# Patient Record
Sex: MALE | Race: WHITE | Employment: UNEMPLOYED | URBAN - METROPOLITAN AREA
[De-identification: names, ages, dates, MRNs, and addresses within clinical notes are randomized per-mention and may not be internally consistent; named-entity substitution may affect disease eponyms.]

---

## 2019-02-22 ENCOUNTER — OFFICE VISIT (OUTPATIENT)
Dept: FAMILY MEDICINE CLINIC | Age: 64
End: 2019-02-22
Payer: MEDICARE

## 2019-02-22 VITALS
WEIGHT: 214 LBS | HEART RATE: 60 BPM | RESPIRATION RATE: 16 BRPM | TEMPERATURE: 98.3 F | HEIGHT: 72 IN | OXYGEN SATURATION: 99 % | BODY MASS INDEX: 28.99 KG/M2 | SYSTOLIC BLOOD PRESSURE: 135 MMHG | DIASTOLIC BLOOD PRESSURE: 87 MMHG

## 2019-02-22 DIAGNOSIS — J01.00 ACUTE NON-RECURRENT MAXILLARY SINUSITIS: Primary | ICD-10-CM

## 2019-02-22 PROCEDURE — 99213 OFFICE O/P EST LOW 20 MIN: CPT | Performed by: FAMILY MEDICINE

## 2019-02-22 PROCEDURE — G8427 DOCREV CUR MEDS BY ELIG CLIN: HCPCS | Performed by: FAMILY MEDICINE

## 2019-02-22 PROCEDURE — G8484 FLU IMMUNIZE NO ADMIN: HCPCS | Performed by: FAMILY MEDICINE

## 2019-02-22 PROCEDURE — 3017F COLORECTAL CA SCREEN DOC REV: CPT | Performed by: FAMILY MEDICINE

## 2019-02-22 PROCEDURE — 1036F TOBACCO NON-USER: CPT | Performed by: FAMILY MEDICINE

## 2019-02-22 PROCEDURE — G8419 CALC BMI OUT NRM PARAM NOF/U: HCPCS | Performed by: FAMILY MEDICINE

## 2019-02-22 RX ORDER — FLUTICASONE PROPIONATE 50 MCG
1 SPRAY, SUSPENSION (ML) NASAL 2 TIMES DAILY
Qty: 1 BOTTLE | Refills: 2 | Status: SHIPPED | OUTPATIENT
Start: 2019-02-22 | End: 2021-12-01 | Stop reason: ALTCHOICE

## 2019-02-22 RX ORDER — BROMPHENIRAMINE MALEATE, PSEUDOEPHEDRINE HYDROCHLORIDE, AND DEXTROMETHORPHAN HYDROBROMIDE 2; 30; 10 MG/5ML; MG/5ML; MG/5ML
5 SYRUP ORAL 4 TIMES DAILY PRN
Qty: 180 ML | Refills: 1 | Status: SHIPPED | OUTPATIENT
Start: 2019-02-22 | End: 2021-12-01 | Stop reason: ALTCHOICE

## 2019-02-22 RX ORDER — AZITHROMYCIN 250 MG/1
TABLET, FILM COATED ORAL
Qty: 1 PACKET | Refills: 0 | Status: SHIPPED | OUTPATIENT
Start: 2019-02-22 | End: 2021-12-01 | Stop reason: ALTCHOICE

## 2019-02-22 ASSESSMENT — ENCOUNTER SYMPTOMS
GASTROINTESTINAL NEGATIVE: 1
EYES NEGATIVE: 1
SINUS PAIN: 1
SHORTNESS OF BREATH: 0
WHEEZING: 1
COUGH: 1
ALLERGIC/IMMUNOLOGIC NEGATIVE: 1
SORE THROAT: 1
SINUS PRESSURE: 1
RHINORRHEA: 1

## 2019-02-22 ASSESSMENT — PATIENT HEALTH QUESTIONNAIRE - PHQ9
SUM OF ALL RESPONSES TO PHQ QUESTIONS 1-9: 0
2. FEELING DOWN, DEPRESSED OR HOPELESS: 0
1. LITTLE INTEREST OR PLEASURE IN DOING THINGS: 0
SUM OF ALL RESPONSES TO PHQ QUESTIONS 1-9: 0
DEPRESSION UNABLE TO ASSESS: PT REFUSES
SUM OF ALL RESPONSES TO PHQ9 QUESTIONS 1 & 2: 0

## 2019-03-21 ENCOUNTER — HOSPITAL ENCOUNTER (EMERGENCY)
Age: 64
Discharge: HOME | End: 2019-03-21
Attending: EMERGENCY MEDICINE
Payer: MEDICARE

## 2019-03-21 VITALS
HEIGHT: 66 IN | RESPIRATION RATE: 18 BRPM | OXYGEN SATURATION: 100 % | BODY MASS INDEX: 30.05 KG/M2 | SYSTOLIC BLOOD PRESSURE: 136 MMHG | HEART RATE: 60 BPM | WEIGHT: 187 LBS | DIASTOLIC BLOOD PRESSURE: 66 MMHG | TEMPERATURE: 98.2 F

## 2019-03-21 DIAGNOSIS — R11.2 NON-INTRACTABLE VOMITING WITH NAUSEA, UNSPECIFIED VOMITING TYPE: Primary | ICD-10-CM

## 2019-03-21 LAB
ABSOLUTE EOS #: 0 K/UL (ref 0–0.4)
ABSOLUTE IMMATURE GRANULOCYTE: ABNORMAL K/UL (ref 0–0.3)
ABSOLUTE LYMPH #: 0.9 K/UL (ref 1–4.8)
ABSOLUTE MONO #: 0.4 K/UL (ref 0.1–1.3)
ANION GAP SERPL CALCULATED.3IONS-SCNC: 10 MMOL/L (ref 9–17)
BASOPHILS # BLD: 1 % (ref 0–2)
BASOPHILS ABSOLUTE: 0.1 K/UL (ref 0–0.2)
BUN BLDV-MCNC: 13 MG/DL (ref 8–23)
BUN/CREAT BLD: ABNORMAL (ref 9–20)
CALCIUM SERPL-MCNC: 9 MG/DL (ref 8.6–10.4)
CHLORIDE BLD-SCNC: 100 MMOL/L (ref 98–107)
CO2: 28 MMOL/L (ref 20–31)
CREAT SERPL-MCNC: 0.8 MG/DL (ref 0.7–1.2)
DIFFERENTIAL TYPE: ABNORMAL
EOSINOPHILS RELATIVE PERCENT: 1 % (ref 0–4)
GFR AFRICAN AMERICAN: >60 ML/MIN
GFR NON-AFRICAN AMERICAN: >60 ML/MIN
GFR SERPL CREATININE-BSD FRML MDRD: ABNORMAL ML/MIN/{1.73_M2}
GFR SERPL CREATININE-BSD FRML MDRD: ABNORMAL ML/MIN/{1.73_M2}
GLUCOSE BLD-MCNC: 129 MG/DL (ref 70–99)
HCT VFR BLD CALC: 43.5 % (ref 41–53)
HEMOGLOBIN: 14.7 G/DL (ref 13.5–17.5)
IMMATURE GRANULOCYTES: ABNORMAL %
LYMPHOCYTES # BLD: 15 % (ref 24–44)
MCH RBC QN AUTO: 31.1 PG (ref 26–34)
MCHC RBC AUTO-ENTMCNC: 33.9 G/DL (ref 31–37)
MCV RBC AUTO: 91.6 FL (ref 80–100)
MONOCYTES # BLD: 7 % (ref 1–7)
NRBC AUTOMATED: ABNORMAL PER 100 WBC
PDW BLD-RTO: 13.4 % (ref 11.5–14.9)
PLATELET # BLD: 184 K/UL (ref 150–450)
PLATELET ESTIMATE: ABNORMAL
PMV BLD AUTO: 7.8 FL (ref 6–12)
POTASSIUM SERPL-SCNC: 3.8 MMOL/L (ref 3.7–5.3)
RBC # BLD: 4.75 M/UL (ref 4.5–5.9)
RBC # BLD: ABNORMAL 10*6/UL
SEG NEUTROPHILS: 76 % (ref 36–66)
SEGMENTED NEUTROPHILS ABSOLUTE COUNT: 4.7 K/UL (ref 1.3–9.1)
SODIUM BLD-SCNC: 138 MMOL/L (ref 135–144)
WBC # BLD: 6.2 K/UL (ref 3.5–11)
WBC # BLD: ABNORMAL 10*3/UL

## 2019-03-21 PROCEDURE — 80048 BASIC METABOLIC PNL TOTAL CA: CPT

## 2019-03-21 PROCEDURE — 36415 COLL VENOUS BLD VENIPUNCTURE: CPT

## 2019-03-21 PROCEDURE — 2580000003 HC RX 258: Performed by: EMERGENCY MEDICINE

## 2019-03-21 PROCEDURE — 99284 EMERGENCY DEPT VISIT MOD MDM: CPT

## 2019-03-21 PROCEDURE — 85025 COMPLETE CBC W/AUTO DIFF WBC: CPT

## 2019-03-21 PROCEDURE — 6360000002 HC RX W HCPCS: Performed by: EMERGENCY MEDICINE

## 2019-03-21 PROCEDURE — 96365 THER/PROPH/DIAG IV INF INIT: CPT

## 2019-03-21 RX ORDER — 0.9 % SODIUM CHLORIDE 0.9 %
1000 INTRAVENOUS SOLUTION INTRAVENOUS ONCE
Status: COMPLETED | OUTPATIENT
Start: 2019-03-21 | End: 2019-03-21

## 2019-03-21 RX ORDER — PROMETHAZINE HYDROCHLORIDE 25 MG/ML
25 INJECTION, SOLUTION INTRAMUSCULAR; INTRAVENOUS ONCE
Status: DISCONTINUED | OUTPATIENT
Start: 2019-03-21 | End: 2019-03-21

## 2019-03-21 RX ORDER — PROMETHAZINE HYDROCHLORIDE 25 MG/1
25 TABLET ORAL EVERY 6 HOURS PRN
Qty: 15 TABLET | Refills: 0 | Status: SHIPPED | OUTPATIENT
Start: 2019-03-21 | End: 2021-12-01

## 2019-03-21 RX ADMIN — SODIUM CHLORIDE 1000 ML: 9 INJECTION, SOLUTION INTRAVENOUS at 11:16

## 2019-03-21 RX ADMIN — PROMETHAZINE HYDROCHLORIDE 25 MG: 25 INJECTION INTRAMUSCULAR; INTRAVENOUS at 11:14

## 2019-03-21 ASSESSMENT — ENCOUNTER SYMPTOMS
ORTHOPNEA: 0
NAUSEA: 1
VOMITING: 1
COUGH: 0
WHEEZING: 0
SHORTNESS OF BREATH: 0
ABDOMINAL PAIN: 0
DIARRHEA: 0
BACK PAIN: 0

## 2019-03-21 ASSESSMENT — PAIN SCALES - GENERAL: PAINLEVEL_OUTOF10: 8

## 2021-11-10 ENCOUNTER — HOSPITAL ENCOUNTER (OUTPATIENT)
Age: 66
Discharge: HOME | End: 2021-11-10
Payer: MEDICARE

## 2021-11-10 ENCOUNTER — HOSPITAL ENCOUNTER (OUTPATIENT)
Dept: GENERAL RADIOLOGY | Age: 66
Discharge: HOME | End: 2021-11-12
Payer: MEDICARE

## 2021-11-10 ENCOUNTER — HOSPITAL ENCOUNTER (OUTPATIENT)
Age: 66
Discharge: HOME | End: 2021-11-12
Payer: MEDICARE

## 2021-11-10 DIAGNOSIS — M46.07 SPINAL ENTHESOPATHY OF LUMBOSACRAL REGION (HCC): ICD-10-CM

## 2021-11-10 LAB
-: ABNORMAL
ABSOLUTE EOS #: 0.1 K/UL (ref 0–0.4)
ABSOLUTE IMMATURE GRANULOCYTE: ABNORMAL K/UL (ref 0–0.3)
ABSOLUTE LYMPH #: 1.3 K/UL (ref 1–4.8)
ABSOLUTE MONO #: 0.5 K/UL (ref 0.1–1.3)
ALBUMIN SERPL-MCNC: 4.3 G/DL (ref 3.5–5.2)
ALBUMIN/GLOBULIN RATIO: ABNORMAL (ref 1–2.5)
ALP BLD-CCNC: 97 U/L (ref 40–129)
ALT SERPL-CCNC: 26 U/L (ref 5–41)
AMORPHOUS: ABNORMAL
ANION GAP SERPL CALCULATED.3IONS-SCNC: 8 MMOL/L (ref 9–17)
AST SERPL-CCNC: 19 U/L
BACTERIA: ABNORMAL
BASOPHILS # BLD: 2 % (ref 0–2)
BASOPHILS ABSOLUTE: 0.1 K/UL (ref 0–0.2)
BILIRUB SERPL-MCNC: 1.53 MG/DL (ref 0.3–1.2)
BILIRUBIN URINE: NEGATIVE
BUN BLDV-MCNC: 12 MG/DL (ref 8–23)
BUN/CREAT BLD: ABNORMAL (ref 9–20)
CALCIUM SERPL-MCNC: 9.1 MG/DL (ref 8.6–10.4)
CASTS UA: ABNORMAL /LPF
CHLORIDE BLD-SCNC: 101 MMOL/L (ref 98–107)
CHOLESTEROL/HDL RATIO: 4.5
CHOLESTEROL: 254 MG/DL
CO2: 28 MMOL/L (ref 20–31)
COLOR: ABNORMAL
COMMENT UA: ABNORMAL
CREAT SERPL-MCNC: 0.82 MG/DL (ref 0.7–1.2)
CRYSTALS, UA: ABNORMAL /HPF
DIFFERENTIAL TYPE: ABNORMAL
EKG ATRIAL RATE: 69 BPM
EKG P AXIS: 65 DEGREES
EKG P-R INTERVAL: 214 MS
EKG Q-T INTERVAL: 408 MS
EKG QRS DURATION: 82 MS
EKG QTC CALCULATION (BAZETT): 437 MS
EKG R AXIS: 10 DEGREES
EKG T AXIS: 68 DEGREES
EKG VENTRICULAR RATE: 69 BPM
EOSINOPHILS RELATIVE PERCENT: 2 % (ref 0–4)
EPITHELIAL CELLS UA: ABNORMAL /HPF
GFR AFRICAN AMERICAN: >60 ML/MIN
GFR NON-AFRICAN AMERICAN: >60 ML/MIN
GFR SERPL CREATININE-BSD FRML MDRD: ABNORMAL ML/MIN/{1.73_M2}
GFR SERPL CREATININE-BSD FRML MDRD: ABNORMAL ML/MIN/{1.73_M2}
GLUCOSE BLD-MCNC: 117 MG/DL (ref 70–99)
GLUCOSE URINE: NEGATIVE
HCT VFR BLD CALC: 46 % (ref 41–53)
HDLC SERPL-MCNC: 56 MG/DL
HEMOGLOBIN: 15.8 G/DL (ref 13.5–17.5)
IMMATURE GRANULOCYTES: ABNORMAL %
KETONES, URINE: NEGATIVE
LDL CHOLESTEROL: 167 MG/DL (ref 0–130)
LEUKOCYTE ESTERASE, URINE: NEGATIVE
LYMPHOCYTES # BLD: 18 % (ref 24–44)
MCH RBC QN AUTO: 31.8 PG (ref 26–34)
MCHC RBC AUTO-ENTMCNC: 34.4 G/DL (ref 31–37)
MCV RBC AUTO: 92.3 FL (ref 80–100)
MONOCYTES # BLD: 7 % (ref 1–7)
MUCUS: ABNORMAL
NITRITE, URINE: NEGATIVE
NRBC AUTOMATED: ABNORMAL PER 100 WBC
OTHER OBSERVATIONS UA: ABNORMAL
PDW BLD-RTO: 14.1 % (ref 11.5–14.9)
PH UA: 7 (ref 5–8)
PLATELET # BLD: 220 K/UL (ref 150–450)
PLATELET ESTIMATE: ABNORMAL
PMV BLD AUTO: 6.2 FL (ref 6–12)
POTASSIUM SERPL-SCNC: 4.8 MMOL/L (ref 3.7–5.3)
PROTEIN UA: ABNORMAL
RBC # BLD: 4.98 M/UL (ref 4.5–5.9)
RBC # BLD: ABNORMAL 10*6/UL
RBC UA: ABNORMAL /HPF
RENAL EPITHELIAL, UA: ABNORMAL /HPF
SEG NEUTROPHILS: 71 % (ref 36–66)
SEGMENTED NEUTROPHILS ABSOLUTE COUNT: 5.2 K/UL (ref 1.3–9.1)
SODIUM BLD-SCNC: 137 MMOL/L (ref 135–144)
SPECIFIC GRAVITY UA: 1.03 (ref 1–1.03)
TOTAL PROTEIN: 7.3 G/DL (ref 6.4–8.3)
TRICHOMONAS: ABNORMAL
TRIGL SERPL-MCNC: 153 MG/DL
TSH SERPL DL<=0.05 MIU/L-ACNC: 0.81 MIU/L (ref 0.3–5)
TURBIDITY: CLEAR
URINE HGB: NEGATIVE
UROBILINOGEN, URINE: NORMAL
VITAMIN D 25-HYDROXY: 29.7 NG/ML (ref 30–100)
VLDLC SERPL CALC-MCNC: ABNORMAL MG/DL (ref 1–30)
WBC # BLD: 7.3 K/UL (ref 3.5–11)
WBC # BLD: ABNORMAL 10*3/UL
WBC UA: ABNORMAL /HPF
YEAST: ABNORMAL

## 2021-11-10 PROCEDURE — 72100 X-RAY EXAM L-S SPINE 2/3 VWS: CPT

## 2021-11-10 PROCEDURE — 80061 LIPID PANEL: CPT

## 2021-11-10 PROCEDURE — 81001 URINALYSIS AUTO W/SCOPE: CPT

## 2021-11-10 PROCEDURE — 85025 COMPLETE CBC W/AUTO DIFF WBC: CPT

## 2021-11-10 PROCEDURE — 93010 ELECTROCARDIOGRAM REPORT: CPT | Performed by: INTERNAL MEDICINE

## 2021-11-10 PROCEDURE — 84443 ASSAY THYROID STIM HORMONE: CPT

## 2021-11-10 PROCEDURE — 82306 VITAMIN D 25 HYDROXY: CPT

## 2021-11-10 PROCEDURE — 80053 COMPREHEN METABOLIC PANEL: CPT

## 2021-11-10 PROCEDURE — 93005 ELECTROCARDIOGRAM TRACING: CPT | Performed by: FAMILY MEDICINE

## 2021-11-10 PROCEDURE — 36415 COLL VENOUS BLD VENIPUNCTURE: CPT

## 2021-11-11 ENCOUNTER — HOSPITAL ENCOUNTER (OUTPATIENT)
Dept: ULTRASOUND IMAGING | Age: 66
Discharge: HOME | End: 2021-11-13
Payer: MEDICARE

## 2021-11-11 DIAGNOSIS — E88.2 LIPOMATOSIS DOLOROSA: ICD-10-CM

## 2021-11-11 PROCEDURE — 76536 US EXAM OF HEAD AND NECK: CPT

## 2021-12-10 ENCOUNTER — HOSPITAL ENCOUNTER (OUTPATIENT)
Dept: NUCLEAR MEDICINE | Age: 66
Discharge: HOME | End: 2021-12-12
Payer: MEDICARE

## 2021-12-10 ENCOUNTER — HOSPITAL ENCOUNTER (OUTPATIENT)
Dept: ULTRASOUND IMAGING | Age: 66
Discharge: HOME | End: 2021-12-12
Payer: MEDICARE

## 2021-12-10 ENCOUNTER — HOSPITAL ENCOUNTER (OUTPATIENT)
Dept: NON INVASIVE DIAGNOSTICS | Age: 66
Discharge: HOME | End: 2021-12-10
Payer: MEDICARE

## 2021-12-10 VITALS — HEIGHT: 69 IN | WEIGHT: 200 LBS | BODY MASS INDEX: 29.62 KG/M2

## 2021-12-10 DIAGNOSIS — R17 ELEVATED BILIRUBIN: ICD-10-CM

## 2021-12-10 DIAGNOSIS — R94.31 ABNORMAL EKG: ICD-10-CM

## 2021-12-10 LAB
LV EF: 70 %
LVEF MODALITY: NORMAL

## 2021-12-10 PROCEDURE — 2580000003 HC RX 258: Performed by: INTERNAL MEDICINE

## 2021-12-10 PROCEDURE — 78452 HT MUSCLE IMAGE SPECT MULT: CPT

## 2021-12-10 PROCEDURE — 3430000000 HC RX DIAGNOSTIC RADIOPHARMACEUTICAL: Performed by: INTERNAL MEDICINE

## 2021-12-10 PROCEDURE — 93017 CV STRESS TEST TRACING ONLY: CPT

## 2021-12-10 PROCEDURE — A9500 TC99M SESTAMIBI: HCPCS | Performed by: INTERNAL MEDICINE

## 2021-12-10 PROCEDURE — 76705 ECHO EXAM OF ABDOMEN: CPT

## 2021-12-10 PROCEDURE — 6360000002 HC RX W HCPCS: Performed by: INTERNAL MEDICINE

## 2021-12-10 RX ORDER — SODIUM CHLORIDE 0.9 % (FLUSH) 0.9 %
5-40 SYRINGE (ML) INJECTION PRN
Status: ACTIVE | OUTPATIENT
Start: 2021-12-10 | End: 2021-12-10

## 2021-12-10 RX ORDER — NITROGLYCERIN 0.4 MG/1
0.4 TABLET SUBLINGUAL EVERY 5 MIN PRN
Status: ACTIVE | OUTPATIENT
Start: 2021-12-10 | End: 2021-12-10

## 2021-12-10 RX ORDER — ALBUTEROL SULFATE 90 UG/1
2 AEROSOL, METERED RESPIRATORY (INHALATION) PRN
Status: ACTIVE | OUTPATIENT
Start: 2021-12-10 | End: 2021-12-10

## 2021-12-10 RX ORDER — ATROPINE SULFATE 0.1 MG/ML
0.5 INJECTION INTRAVENOUS EVERY 5 MIN PRN
Status: ACTIVE | OUTPATIENT
Start: 2021-12-10 | End: 2021-12-10

## 2021-12-10 RX ORDER — SODIUM CHLORIDE 0.9 % (FLUSH) 0.9 %
10 SYRINGE (ML) INJECTION PRN
Status: DISCONTINUED | OUTPATIENT
Start: 2021-12-10 | End: 2021-12-13 | Stop reason: HOSPADM

## 2021-12-10 RX ORDER — SODIUM CHLORIDE 9 MG/ML
500 INJECTION, SOLUTION INTRAVENOUS CONTINUOUS PRN
Status: ACTIVE | OUTPATIENT
Start: 2021-12-10 | End: 2021-12-10

## 2021-12-10 RX ORDER — METOPROLOL TARTRATE 5 MG/5ML
5 INJECTION INTRAVENOUS EVERY 5 MIN PRN
Status: ACTIVE | OUTPATIENT
Start: 2021-12-10 | End: 2021-12-10

## 2021-12-10 RX ORDER — AMINOPHYLLINE DIHYDRATE 25 MG/ML
50 INJECTION, SOLUTION INTRAVENOUS PRN
Status: ACTIVE | OUTPATIENT
Start: 2021-12-10 | End: 2021-12-10

## 2021-12-10 RX ADMIN — REGADENOSON 0.4 MG: 0.08 INJECTION, SOLUTION INTRAVENOUS at 10:11

## 2021-12-10 RX ADMIN — SODIUM CHLORIDE, PRESERVATIVE FREE 10 ML: 5 INJECTION INTRAVENOUS at 09:06

## 2021-12-10 RX ADMIN — SODIUM CHLORIDE, PRESERVATIVE FREE 10 ML: 5 INJECTION INTRAVENOUS at 07:57

## 2021-12-10 RX ADMIN — TETRAKIS(2-METHOXYISOBUTYLISOCYANIDE)COPPER(I) TETRAFLUOROBORATE 34 MILLICURIE: 1 INJECTION, POWDER, LYOPHILIZED, FOR SOLUTION INTRAVENOUS at 10:13

## 2021-12-10 RX ADMIN — TETRAKIS(2-METHOXYISOBUTYLISOCYANIDE)COPPER(I) TETRAFLUOROBORATE 10.9 MILLICURIE: 1 INJECTION, POWDER, LYOPHILIZED, FOR SOLUTION INTRAVENOUS at 07:56

## 2021-12-10 NOTE — PROGRESS NOTES
Lexiscan Stress Tech performs patient preparation of physical comfort, review test procedures, pre-stress EKG. Lung Sounds clear t/o. Consent verified by pt. .  Educated patient on test procedure and possible side effects of Lexiscan as well as s/s to report. Cardiologist reviewed pre-test EKG and is present for test.  Patient tolerated test well with minor retching/dry heaves which resolved to baseline after test with caffeine. Start 144/93, 57  Stop 155/90, 81  EKG portion of testing is completed and negative nuc. med. portion is still pending.

## 2021-12-15 NOTE — PROCEDURES
[address] [place], [place] [address]                              CARDIAC STRESS TEST    PATIENT NAME: Nam Gutierrez                        :        1955  MED REC NO:   [id number]                              ROOM:  ACCOUNT NO:   [de-identified]                           ADMIT DATE: 12/10/2021  PROVIDER:     Evelyn Muñoz    DATE OF STUDY:  12/10/2021    TEST TYPE: LEXISCAN CARDIOLITE STRESS TEST  INDICATION: ABNORMAL EKG  REFERRING PHYSICIAN: GABY WOODARD MD    RESTING HEART RATE: 57 BEATS PER MINUTE  RESTING BLOOD PRESSURE: 144/93 MMHG    MEDICATION(S) GIVEN: 0.4 MG IV LEXISCAN  REASON FOR TERMINATION: MEDICATION INFUSION COMPLETE    RESTING EKG: NORMAL SINUS BRADYCARDIA HEART RATE 57 BEATS PER MINUTE  STRESS HEART RESPONSE: NORMAL RESPONSE  BLOOD PRESSURE RESPONSE: APPROPRIATE  STRESS EKG: NO CHANGES SEEN HEART RATE INCREASE  BEATS PER MINUTE  CHEST DISCOMFORT: NO PAIN DURING STRESS  ISCHEMIC EKG CHANGES: NONE    EKG IMPRESSION: ELECTROCARDIOGRAPHICALLY NEGATIVE LEXISCAN STRESS TEST. RADIOISOTOPE RESULTS TO FOLLOW FROM THE DEPARTMENT OF NUCLEAR MEDICINE.     Juliocesar Hernandez    D: 12/15/2021 15:41:36       T: 12/15/2021 15:42:30     AS/MAYO  Job#: [id number]     Doc#: Unknown    CC:    (Retain this field even if not dictated or not decipherable)

## 2021-12-16 ENCOUNTER — HOSPITAL ENCOUNTER (OUTPATIENT)
Dept: PHYSICAL THERAPY | Age: 66
Setting detail: THERAPIES SERIES
Discharge: HOME | End: 2021-12-16
Payer: MEDICARE

## 2021-12-16 PROCEDURE — 97161 PT EVAL LOW COMPLEX 20 MIN: CPT

## 2021-12-16 PROCEDURE — 97110 THERAPEUTIC EXERCISES: CPT

## 2021-12-16 NOTE — CONSULTS
509 Count includes the [facility]   Outpatient Physical Therapy  Physical Therapy Lumbar Evaluation    Date:  2021  Patient: Ingrid Coughlin  : [birth year]  MRN: [id number]  Physician: Ladi Hughes MD    Insurance: [employer]. Auth required after 30 visits. Medical Diagnosis: M62.830 (ICD-10-CM) - Muscle spasm of back   Rehab Codes: M54.6, R53.1, M25.60  Onset Date: Ongoing. At least since 2018   Next 's appt: As needed    Subjective:   CC: Chronic upper back and scapular pain/tightness  HPI: Pt reports longstanding hx of chronic mid/upper back pain but has progressively worsened over the years. 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 Pt states that his pain and mobility has significantly worsened in the last 2-3 years and admits that has been increasingly sedentary since he retired. Pt's sister was present for today's evaluation and states that since he retired, he stopped moving all together and now sits in front of the television all day long. Pt reports stiffness along B scapular and thoracic regions, instability on feet, weakness in his muscles. Recently established care with a PCP and was referred to PT for conservative mgmt. Of note, pt also has a lipoma located at the posterior aspect of his neck and will be undergoing an excision procedure in the near future.      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 Medications: [] Refer to full medical record [] None [x] Other: Tylenol and Celebrex  Allergies:      [x] Refer to full medical record  [] None [] Other:    Work Status: Retired     Prior Level of Function: Previously active and independent when he was working full-time. States that over the last several years he has stopped any kind of physical activity. Pain:  [x] Yes  [] No Location: Mid-back and shoulder blades Pain Rating: (0-10 scale) 8/10  Pain altered Tx:  [x] Yes  [] No  Action:    Symptoms:  [] Improving [] Worsening [] Same  Aggravating factors: Movement of neck and L UE  Alleviating factors: Forward bending      Functional Status Previous level of function Current level of function Comments   Sitting [x] Independent  [] Deficit [x] Independent  [] Deficit    Standing/walking [x] Independent  [] Deficit [] Independent  [x] Deficit Unsteadiness on feet  Pain in mid-back after ~5 min of walking   Driving [x] Independent  [] Deficit [] Independent  [x] Deficit Stiffness with turning head   Housekeeping/Meal Preparation [x] Independent  [] Deficit [] Independent  [x] Deficit Pain, fatigue and difficulty   Lifting/Carrying [x] Independent  [] Deficit [] Independent  [x] Deficit Minimal ability   Bending/Reaching [x] Independent  [] Deficit [] Independent  [x] Deficit Painful with UE reaching and movement of spine aside from forward bending   Stair climbing [x] Independent  [] Deficit [] Independent  [x] Deficit Difficulty/weak   Pivoting [x] Independent  [] Deficit [] Independent  [x] Deficit Tight   Squatting [] Independent  [] Deficit [] Independent  [] Deficit    Other [] Independent  [] Deficit [] Independent  [] Deficit      Patient Goals/Rehab Expectations:  To be able to move better       Objective:      OBSERVATION No Deficit Deficit Not Tested Comments   Forward Head [] [x] [] Rounded Shoulders [] [x] []    Kyphosis [] [x] []    Lordosis [x] [] []    Scoliosis [x] [] []    Innominate Alignment [] [] [x]    NEUROLOGICAL       Reflexes [] [] [x]    Compression/Distraction [] [x] [] Discomfort with PA mobilization    Sensation [] [] []    FALL RISK? [] []     Comments: ~Golf ball sized lipoma along posterior aspect of cervical spine                                  Pichardo Fall Risk Assessment    Risk Factor Scale  Score   History of Falls [] Yes  [x] No 25  0 0   Secondary Diagnosis [] Yes  [x] No 15  0 0   Ambulatory Aid [] Furniture  [] Crutches/cane/walker  [x] None/bedrest/wheelchair/nurse 30  15  0 0   IV/Heparin Lock [] Yes  [x] No 20  0 0   Gait/Transferring [] Impaired  [x] Weak  [] Normal/bedrest/immobile 20  10  0 10   Mental Status [] Forgets limitations  [x] Oriented to own ability 15  0 0      Total: 10     Based on the Assessment score: check the appropriate box.     [x]  No intervention needed   Low =   Score of 0-24    []  Use standard prevention interventions Moderate =  Score of 24-44   [] Give patient handout and discuss fall prevention strategies   [] Establish goal of education for patient/family RE: fall prevention strategies    []  Use high risk prevention interventions High = Score of 45 and higher   [] Give patient handout and discuss fall prevention strategies   [] Establish goal of education for patient/family Re: fall prevention strategies   [] Discuss lifeline / other resources            Range of Motion  Left Range of Motion  Right Strength  Left Strength  Right   Thoracolumbar Flexion 75%  Tight but relieves discomfort 75%  Tight but relieves discomfort     Thoracolumbar Extension 75%  Pain 75%  Pain     Thoracolumbar Rotation 75%  Tightness 75%  Tightness     Thoracolumbar Side Bend 100% 100%     Hip Flexion   3/5 3/5   Hip Abduction   3+/5 3+/5   Hip Adduction   4/5 4/5   Hip Extension   3/5 3/5   Hip ER       Hip IR       Knee Flexion   4/5 4/5   Knee Extension       Dorsiflexion       Plantar flexion       Inversion       Eversion           Joint Mobility: Diffusely hypomobile at all thoracic segments     Palpation: Tight and TTP at B upper traps and levator scap    Gait: Independent [x]           Modified Independent []            Not independent []  Comments: Pt ambulates independently without an AD but with notable unsteadiness. Amb with a wide PB with notable sway and unsteadiness with turning    Tinetti Balance & Gait Assessment     0 1 2   Sitting balance Leans/slides in chair  [] Steady; safe  [x]    Rises from chair Unable without help  [] Uses arms  [x] Able without arms  []   Attempts to rise Unable without help  [] >1 attempt  [] 1 attempt  [x]   Immediate standing balance Unsteady  [] Steady w/AD  [] Steady;  Independent  [x]   Standing balance Unsteady  [] Wide PB  [x] Steady; neutral PB  []   Nudged Begins to fall  [x]  (NT but staggers with regular gait) Staggers/Grabs/Catches  [] Steady  []   Standing (eyes closed) Unsteady  [x]  (NT but unsteady with eyes open) Steady  []    360 deg Turn (steps) Discontinuous steps  [x] Continuous steps  []    360 deg Turn (steadiness) Unsteady  [x] Steady  []    Sitting Down Unsafe  [] Uses arms; not smooth  [x] Safe and smooth  []         Gait initiation Hesitancy  [x] No hesitancy  []    Step Length (R) R swing foot does not pass stance foot  [] R swing foot passes stance foot  [x]    Step Length (L) L swing foot does not pass stance foot  [] L swing foot passes stance foot  [x]    Step Height (R) R foot does not clear completely  [] R foot clears completely  [x]    Step Height (L) L foot does not clear completely  [] L foot clears completely  [x]    Step Symmetry Not equal  [x] Equal  []    Step Continuity Discontinuous  [x] Continuous  []    Walking Path Marked deviation  [x] AD  [] Straight w/o AD  []   Trunk Marked sway or AD  [x] Flexed knees/back or arms out  [] No sway/deviation  []   Walking Stance Heels apart  [x] Heels almost touch  []     Balance score: 8 / 16  Gait score: 4 / 12  Total score: 12 / 28  Fall risk: Low (>/=24)  []      Moderate (19-23)  []       High (</=18)  [x]      Assessment:  Problems:    [x] ? Pain:  [x] ? ROM:  [x] ? Strength:  [x] ? Function:  [] Other:    Pt is a 62 y/o M referred to PT with chronic mid/upper back and scapular pain which has progressively worsened over the years, likely due to an increasingly sedentary lifestyle over the last couple years. Pt presents to PT evaluation today accompanied by his sister, who helped with acquiring all subjective information as pt was highly distractible and overall a very difficult historian. Pt presents to PT evaluation with vague, diffuse sx, but notable limitations and discomfort with thoracic mobility. Soft tissue dysfunction noted along B scapular regions and poor overall postural control. Significant generalized weakness in B UEs and LEs consistent with an increasingly sedentary lifestyle. Also noted a moderate sized lipoma along the posterior aspect of pt's neck, but advised pt that this is likely not contributing to sx at this time. Skilled PT intervention is required to modulate sx, improve mobility, and general strength/stability necessary to improve ADL efficiency and tolerances. Pt is also significantly unsteady on feet due to weakness and limited physical activity. Significant time spent on education today as pt demonstrates self-limiting behavior and initial poor buy-in to therapy, but will likely require additional reinforcement. Initial mobility based home program provided today and will continue to progress as appropriate. STG: (to be met in 5 treatments)  1. Pt will self report worst pain no greater than 4/10 in order to better tolerate ADLs/work activities with minimal dysfunction  2.  Pt will improve thoracolumbar AROM to 90% or greater in all planes in order to demonstrate ability to move/reach in all planes unrestricted at PLOF  LTG: (to be met in 10 treatments)  1. Pt will demonstrate improved B UE and LE strength to 4/5 or greater in order to demonstrate improved stability/strength necessary for unrestricted ADLs/work activities  2. Pt will decrease FLORENCIO to 15% impaired or less in order to demonstrate improved functional tolerances at PLOF with minimal restriction/dysfunction  3. Pt will improve Tinetti Balance & Gait score   4. Pt will demonstrate independence with a long term HEP for continued progress/maintenance after completion of PT      Functional Assessment Used: Mod. FLORENCIO  Current Status Score: 46% impaired  Goal Status Score: 15% impaired    Evaluation Complexity:  History (Personal factors, comorbidities) [] 0 [x] 1-2 [] 3+   Exam (limitations, restrictions) [x] 1-2 [] 3 [] 4+   Clinical presentation (progression) [x] Stable [] Evolving  [] Unstable   Decision Making [x] Low [] Moderate [] High    [x] Low Complexity [] Moderate Complexity [] High Complexity     Rehab Potential:  [x] Good  [] Fair  [] Poor   Suggested Professional Referral:  [x] No  [] Yes:  Barriers to Goal Achievement[de-identified]  [] No  [x] Yes: Self-limiting behavior  Domestic Concerns:  [x] No  [] Yes:    Pt. Education:  [x] Plans/Goals, Risks/Benefits discussed  [x] Home exercise program  Method of Education: [x] Verbal  [x] Demo  [x] Written (Sebastian Monroy Access Code [email or web address])  Comprehension of Education:  [x] Verbalizes understanding. [x] Demonstrates understanding. [] Needs Review. [] Demonstrates/verbalizes understanding of HEP/Ed previously given.     Treatment Plan:  [x] Therapeutic Exercise   71568  [] Iontophoresis: 4 mg/mL Dexamethasone Sodium Phosphate  mAmin  09064   [x] Therapeutic Activity  65358 [] Vasopneumatic cold with compression  27705    [x] Gait Training   65219 [] Ultrasound   29416   [x] Neuromuscular Re-education  64599 [] Electrical Stimulation Unattended  79764   [x] Manual Therapy  04344 [] Electrical Stimulation Attended  18945   [x] Instruction in HEP  [] Lumbar/Cervical Traction  [id number]   [] Aquatic Therapy   [id number] [] Cold/hotpack    [] Massage   95915      [] Dry Needling, 1 or 2 muscles  15914   [] Biofeedback, first 15 minutes   39820  [] Biofeedback, additional 15 minutes   71850 [] Dry Needling, 3 or more muscles  22457          Frequency: 2x/week for 10 visits    Todays Treatment:  Modalities:   Precautions: None  Exercises:  Exercise Reps/ Time Weight/ Level Comments   Lower trunk rotations 10 reps  Initial HEP   Sidelying book openers 5 reps R/L  Initial HEP   Child's pose 30''   Initial HEP               Other:    Specific Instructions for next treatment: Emphasis on restoring pain free mobility in scapulothoracic region and progressive UE and LE strengthening/stabilization as appropriate. Incorporate dynamic balance activities to improve gait safety and stability    Treatment Charges: Mins Units   [x] Evaluation       [x]  Low       []  Moderate       []  High 37 1   []  Modalities     [x]  Ther Exercise 8 1   []  Manual Therapy     []  Ther Activities     []  Aquatics     []  Neuromuscular     []  Gait Training     []  Dry Needling           1-2 muscles     []  Dry Needling           3 or more muscles     [] Vasocompression     []  Other       TOTAL TREATMENT TIME: 45    Time in: 9:05am    Time Out: 9:50am    Electronically signed by: Crystal San PT        Physician Signature:________________________________Date:__________________  By signing above or cosigning this note, I have reviewed this plan of care and certify a need for medically necessary rehabilitation services.      *PLEASE SIGN ABOVE AND FAX BACK ALL PAGES*

## 2021-12-22 ENCOUNTER — HOSPITAL ENCOUNTER (OUTPATIENT)
Dept: PHYSICAL THERAPY | Age: 66
Setting detail: THERAPIES SERIES
End: 2021-12-22
Payer: MEDICARE

## 2021-12-28 ENCOUNTER — HOSPITAL ENCOUNTER (OUTPATIENT)
Dept: PHYSICAL THERAPY | Age: 66
Setting detail: THERAPIES SERIES
Discharge: HOME | End: 2021-12-28
Payer: MEDICARE

## 2021-12-28 NOTE — PROGRESS NOTES
[] [address]  [place]: [phone]   F: [phone]     Physical Therapy Cancel/No Show note    Date: 2021  Patient: Jerson Zamorano  : 1955  MRN: [id number]    Visit Count:1/10  Cancels/No Shows to date:     For today's appointment patient:    [x]  Cancelled    [] Rescheduled appointment    [] No-show     Reason given by patient:    [x]  Patient ill    []  Conflicting appointment    [] No transportation      [] Conflict with work    [] No reason given    [] Weather related    [] COVID-19    [] Other:      Comments:        [] Next appointment was confirmed    Electronically signed by: Carolynn Culp PTA

## 2021-12-30 ENCOUNTER — APPOINTMENT (OUTPATIENT)
Dept: PHYSICAL THERAPY | Age: 66
End: 2021-12-30
Payer: MEDICARE

## 2022-01-06 ENCOUNTER — HOSPITAL ENCOUNTER (OUTPATIENT)
Dept: PHYSICAL THERAPY | Age: 67
Setting detail: THERAPIES SERIES
Discharge: HOME | End: 2022-01-06

## 2022-03-28 ENCOUNTER — CLINICAL DOCUMENTATION (OUTPATIENT)
Dept: PHYSICAL THERAPY | Age: 67
End: 2022-03-28

## 2022-03-28 NOTE — DISCHARGE SUMMARY
[x] [facility]) @ [facility]  [address]  [place], [address] [place] [facility]  Phone [phone]  Fax [phone]    Physical Therapy Discharge Note    Date: 3/28/2022      Patient: Orlando Vu  :   MRN: [id number]    Physician: Marilyn Mosley MD     Diagnosis: X28.650 (ICD-10-CM) - Muscle spasm of back  Onset Date: Ongoing. At least since 2018    Rehab Diagnosis: Chronic back pain/stiffness, weakness  ICD-10 Codes: M54.6, R53.1, M25.60  Total visits attended: 1/10  Cancels/No shows:   Date of initial visit: 21                   [] Patient recovered from conditions. Treatment goals were met. [] Patient received maximum benefit. No further therapy indicated at this time. [] Patient demonstrated improvement from condition with  ** Of  ** Short term goals met. []Patient demonstrated improvement from condition with **   Of **  Long term goals met. [] Patient to continue exercise/home instructions independently. [] Therapy interrupted due to:    [] Patient has 2 or more no shows/cancels, is discontinued per our policy. [] Patient has completed prescribed number of treatment sessions. [x] Other: Pt cancelled all remaining appts after initial evaluation due to COVID-19, expressing that he would contact clinic when ready to resume therapy. No additional communication has been received at this time and pt will be discharged as he has not been seen in clinic in over 3 months (since 21)     Pain level at evaluation was       8/10 and at discharge was      Unknown/10    It Is My Understanding That The:  [] Patient returned to work. [] Patient demonstrated improved level of function. [] Patient returned to previous functional level.   [x] Patient's current functional status is unknown due to no-shows  [] Other:     Recommendations/Comments:         Treatment Included:     [] Therapeutic Exercise   14105  [] Iontophoresis: 4 mg/mL Dexamethasone Sodium Phosphate  mAmin  67680   [] Therapeutic Activity  58914 [] Vasopneumatic cold with compression  80706    [] Gait Training   26921 [] Ultrasound   [id number]   [] Neuromuscular Re-education  00416 [] Electrical Stimulation Unattended  24657   [] Manual Therapy  23374 [] Electrical Stimulation Attended  47063   [x] Instruction in HEP  [] Lumbar/Cervical Traction  [id number]   [] Aquatic Therapy   14462 [] Cold/hotpack    [] Massage   12924      [] Dry Needling, 1 or 2 muscles  17312   [] Biofeedback, first 15 minutes   75784  [] Biofeedback, additional 15 minutes   80179 [] Dry Needling, 3 or more muscles  18203                If you have any questions or concerns regarding this patient's care, please contact us.    Thank you for your referral.      Electronically signed by: Joby Centeno PT

## 2022-09-01 ENCOUNTER — HOSPITAL ENCOUNTER (OUTPATIENT)
Age: 67
Discharge: HOME | End: 2022-09-01
Payer: MEDICARE

## 2022-09-01 DIAGNOSIS — R17 ELEVATED BILIRUBIN: ICD-10-CM

## 2022-09-01 DIAGNOSIS — E78.5 DYSLIPIDEMIA: ICD-10-CM

## 2022-09-01 DIAGNOSIS — I31.39 PERICARDIAL EFFUSION: ICD-10-CM

## 2022-09-01 LAB
ALBUMIN SERPL-MCNC: 4.4 G/DL (ref 3.5–5.2)
ALP BLD-CCNC: 93 U/L (ref 40–129)
ALT SERPL-CCNC: 29 U/L (ref 5–41)
AST SERPL-CCNC: 30 U/L
BILIRUB SERPL-MCNC: 1 MG/DL (ref 0.3–1.2)
BILIRUBIN DIRECT: 0.18 MG/DL
BILIRUBIN, INDIRECT: 0.82 MG/DL (ref 0–1)
CHOLESTEROL/HDL RATIO: 3.2
CHOLESTEROL: 176 MG/DL
HDLC SERPL-MCNC: 55 MG/DL
LDL CHOLESTEROL: 108 MG/DL (ref 0–130)
MAGNESIUM: 2.2 MG/DL (ref 1.6–2.6)
TOTAL PROTEIN: 6.8 G/DL (ref 6.4–8.3)
TRIGL SERPL-MCNC: 67 MG/DL

## 2022-09-01 PROCEDURE — 36415 COLL VENOUS BLD VENIPUNCTURE: CPT

## 2022-09-01 PROCEDURE — 83735 ASSAY OF MAGNESIUM: CPT

## 2022-09-01 PROCEDURE — 80061 LIPID PANEL: CPT

## 2022-09-01 PROCEDURE — 80076 HEPATIC FUNCTION PANEL: CPT

## 2023-01-03 ENCOUNTER — HOSPITAL ENCOUNTER (OUTPATIENT)
Age: 68
Discharge: HOME | End: 2023-01-03
Payer: MEDICARE

## 2023-01-03 LAB
CHOLESTEROL/HDL RATIO: 3.4
CHOLESTEROL: 193 MG/DL
HDLC SERPL-MCNC: 56 MG/DL
LDL CHOLESTEROL: 108 MG/DL (ref 0–130)
TRIGL SERPL-MCNC: 143 MG/DL

## 2023-01-03 PROCEDURE — 36415 COLL VENOUS BLD VENIPUNCTURE: CPT

## 2023-01-03 PROCEDURE — 80061 LIPID PANEL: CPT
